# Patient Record
Sex: FEMALE | Race: WHITE | Employment: FULL TIME | URBAN - METROPOLITAN AREA
[De-identification: names, ages, dates, MRNs, and addresses within clinical notes are randomized per-mention and may not be internally consistent; named-entity substitution may affect disease eponyms.]

---

## 2020-02-28 ENCOUNTER — OFFICE VISIT (OUTPATIENT)
Dept: NEUROLOGY | Age: 35
End: 2020-02-28

## 2020-02-28 VITALS
SYSTOLIC BLOOD PRESSURE: 98 MMHG | HEIGHT: 60 IN | DIASTOLIC BLOOD PRESSURE: 62 MMHG | WEIGHT: 113 LBS | RESPIRATION RATE: 20 BRPM | BODY MASS INDEX: 22.19 KG/M2

## 2020-02-28 DIAGNOSIS — S06.0X0A CONCUSSION WITHOUT LOSS OF CONSCIOUSNESS, INITIAL ENCOUNTER: Primary | ICD-10-CM

## 2020-02-28 RX ORDER — AMITRIPTYLINE HYDROCHLORIDE 25 MG/1
25 TABLET, FILM COATED ORAL
Qty: 30 TAB | Refills: 2 | Status: SHIPPED | OUTPATIENT
Start: 2020-02-28 | End: 2020-04-08
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
## 2020-04-03 ENCOUNTER — TELEPHONE (OUTPATIENT)
Dept: NEUROLOGY | Age: 35
End: 2020-04-03

## 2020-04-08 ENCOUNTER — VIRTUAL VISIT (OUTPATIENT)
Dept: NEUROLOGY | Age: 35
End: 2020-04-08

## 2020-04-08 DIAGNOSIS — S06.0X0A CONCUSSION WITHOUT LOSS OF CONSCIOUSNESS, INITIAL ENCOUNTER: Primary | ICD-10-CM

## 2020-04-08 RX ORDER — LIDOCAINE 3% TOPICAL ANESTHETIC CREAM 0.03 G/G
CREAM TOPICAL
COMMUNITY
Start: 2020-03-02 | End: 2021-02-11 | Stop reason: ALTCHOICE

## 2020-04-08 RX ORDER — TRIAMCINOLONE ACETONIDE 1 MG/ML
LOTION TOPICAL
COMMUNITY
Start: 2020-01-30 | End: 2021-02-11 | Stop reason: ALTCHOICE

## 2020-04-08 RX ORDER — ERGOCALCIFEROL 1.25 MG/1
50000 CAPSULE ORAL
COMMUNITY
End: 2020-04-08

## 2020-04-08 RX ORDER — ACETAMINOPHEN 500 MG
TABLET ORAL
COMMUNITY
End: 2021-02-11 | Stop reason: ALTCHOICE
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
## 2020-05-05 ENCOUNTER — TELEPHONE (OUTPATIENT)
Dept: NEUROLOGY | Age: 35
End: 2020-05-05

## 2020-05-05 DIAGNOSIS — S06.0X0A CONCUSSION WITHOUT LOSS OF CONSCIOUSNESS, INITIAL ENCOUNTER: Primary | ICD-10-CM

## 2020-05-05 DIAGNOSIS — G43.009 MIGRAINE WITHOUT AURA AND WITHOUT STATUS MIGRAINOSUS, NOT INTRACTABLE: ICD-10-CM

## 2020-05-05 NOTE — TELEPHONE ENCOUNTER
----- Message from Madison Patricia sent at 5/4/2020  3:13 PM EDT -----  Regarding: Dr. Johana Sheehan  Pt would like a call back regarding reoccurring symptoms.  Contact is  [phone]

## 2020-05-06 NOTE — TELEPHONE ENCOUNTER
Mandy Akhtar, MD Zuleta, April M, LPN   Caller: Unspecified (Today,  3:07 PM)             Order MRI brain without contrast, EEG, Doppler   Follow  up NP after          Called and spoke with patient and gave her MDs advice. I ordered brain MRI without contrast and gave her the # to call and schedule and scheduled her in [place] for doppler and EEG for next week. Patient advised to call back when MRI is done to set up follow up appointment with NP to discuss results.
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
## 2020-05-11 ENCOUNTER — TELEPHONE (OUTPATIENT)
Dept: NEUROLOGY | Age: 35
End: 2020-05-11

## 2020-05-11 NOTE — TELEPHONE ENCOUNTER
----- Message from Beata Cope sent at 5/11/2020 12:23 PM EDT -----  Regarding: /Telephone  General Message/Vendor Calls    Caller's first and last name:Azalea Yanes      Reason for call: orders for new location       Callback required yes/no and why:yes      Best contact number(s):[phone]      Details to clarify the request:Pt called requesting new orders sent to [address] [place] [address] , pt changed location fax umber is [phone] . David KERN has approved the authorization .  Pt appt is scheduled for May 13 at 8:00 am .      Beata Pechristy

## 2020-05-12 ENCOUNTER — OFFICE VISIT (OUTPATIENT)
Dept: NEUROLOGY | Age: 35
End: 2020-05-12

## 2020-05-12 VITALS — TEMPERATURE: 98.7 F

## 2020-05-12 DIAGNOSIS — S06.0X0A CONCUSSION WITHOUT LOSS OF CONSCIOUSNESS, INITIAL ENCOUNTER: Primary | ICD-10-CM

## 2020-05-12 NOTE — PROCEDURES
EEG:      Date:  05/12/20    Requesting Physician:  Kim Stevenson MD    An EEG is requested in this 27year-old, status post concussion, to evaluate for epileptiform abnormality. Medications:  Medications are listed as vitamins. This tracing is obtained during the awake state. During wakefulness there are intermittent runs of posteriorly-dominant and symmetrical low-to-medium amplitude 9 cycle per second activities which attenuate with eye opening. Lower-voltage faster-frequency activities are seen symmetrically over the anterior head regions. Hyperventilation is performed for three minutes and little alters the tracing. Intermittent photic stimulation little alters the tracing. Sleep is not attained. Interpretation: This EEG recorded during the awake state is normal.  No epileptiform or lateralized abnormalities are seen.

## 2020-05-28 ENCOUNTER — TELEPHONE (OUTPATIENT)
Dept: NEUROLOGY | Age: 35
End: 2020-05-28

## 2020-05-28 NOTE — TELEPHONE ENCOUNTER
Called and advised patient to schedule VV with Dr. Dana Douglas to discuss results. I scheduled her for Monday afternoon 6/1.

## 2020-05-28 NOTE — TELEPHONE ENCOUNTER
----- Message from Miriam Jefferson sent at 5/27/2020  2:58 PM EDT -----  Regarding: /telephone  General Message/Vendor Calls    Caller's first and last name:      Reason for call: The pt would like the results from her testing.       Callback required yes/no and why: yes      Best contact number(s):[phone]      Details to clarify the request:

## 2020-06-01 ENCOUNTER — VIRTUAL VISIT (OUTPATIENT)
Dept: NEUROLOGY | Age: 35
End: 2020-06-01

## 2020-06-01 DIAGNOSIS — G44.59 OTHER COMPLICATED HEADACHE SYNDROME: Primary | ICD-10-CM

## 2020-06-01 NOTE — PROGRESS NOTES
Chief Complaint   Patient presents with    Results     virtual visit--discuss results from doppler, EEG, and brain MRI.
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
## 2020-06-22 DIAGNOSIS — G44.59 OTHER COMPLICATED HEADACHE SYNDROME: Primary | ICD-10-CM

## 2020-06-22 RX ORDER — AMITRIPTYLINE HYDROCHLORIDE 25 MG/1
25 TABLET, FILM COATED ORAL
Qty: 30 TAB | Refills: 3 | Status: SHIPPED | OUTPATIENT
Start: 2020-06-22 | End: 2020-11-17 | Stop reason: SDUPTHER

## 2020-06-22 NOTE — TELEPHONE ENCOUNTER
Still having HAs, would like to restart amitriptyline. She was taking 25 mg qhs. I pended this if this is what you would like for her to restart.

## 2020-09-01 ENCOUNTER — OFFICE VISIT (OUTPATIENT)
Dept: NEUROLOGY | Age: 35
End: 2020-09-01
Payer: COMMERCIAL

## 2020-09-01 VITALS
OXYGEN SATURATION: 98 % | WEIGHT: 110 LBS | HEART RATE: 88 BPM | DIASTOLIC BLOOD PRESSURE: 62 MMHG | SYSTOLIC BLOOD PRESSURE: 100 MMHG | TEMPERATURE: 96.5 F | HEIGHT: 60 IN | BODY MASS INDEX: 21.6 KG/M2 | RESPIRATION RATE: 14 BRPM

## 2020-09-01 DIAGNOSIS — G44.59 OTHER COMPLICATED HEADACHE SYNDROME: Primary | ICD-10-CM

## 2020-09-01 PROCEDURE — 99213 OFFICE O/P EST LOW 20 MIN: CPT | Performed by: PSYCHIATRY & NEUROLOGY

## 2020-09-01 NOTE — PROGRESS NOTES
Chief Complaint   Patient presents with    Concussion     Sleep cycle has been better since starting elavil so \"brain fog has pretty much cleared\".     Does get HAs with any ETOH use, even 1/2 glass of wine

## 2020-09-01 NOTE — PROGRESS NOTES
[facility] and [address] at [facility] at [address] [facility], [address] [facility] [address] [facility], [address] [place] [address]   [phone]              Chief Complaint   Patient presents with    Concussion     Current Outpatient Medications   Medication Sig Dispense Refill    amitriptyline (ELAVIL) 25 mg tablet Take 1 Tab by mouth nightly. 30 Tab 3    Lactobacillus acidophilus (Probiotic) 10 billion cell cap Probiotic   daily      lidocaine 3 % crea       phytonadione, vit K1, (MEPHYTON PO) vitamin K   daily      triamcinolone (KENALOG) 0.1 % lotion         Allergies   Allergen Reactions    Pcn [Penicillins] Rash     Social History     Tobacco Use    Smoking status: Never Smoker    Smokeless tobacco: Never Used   Substance Use Topics    Alcohol use: Yes     Comment: wine/whisky    Drug use: Not on file     54-year-old lady returns today for follow-up. Her last visit with me was June 1. At that time we discussed her normal test results. She status post concussion and recurrence of headaches. We discussed going back on Elavil versus seeing how things went. He decided to stay off the Elavil and call the office if headaches were to the point that she wanted to retry those. She did call the office on 22 June noting that she wanted to restart Elavil. That prescription was sent to her pharmacy. She returns today and reports off the Elavil. She feels much better. She does have headaches triggered by wine and that is a bit disturbing for her because she likes to have a glass of wine with dinner. Examination  Visit Vitals  /62 (BP 1 Location: Left arm, BP Patient Position: Sitting)   Pulse 88   Temp (!) 96.5 °F (35.8 °C)   Resp 14   Ht 5' (1.524 m)   Wt 49.9 kg (110 lb)   SpO2 98%   BMI 21.48 kg/m²     Awake alert oriented and conversant. Normal speech and legs. Normal cognitive function. No ataxia. Steady gait    Impression/Plan  Status post concussion doing much better off of Elavil. Continue off meds. Discussed that is not uncommon for alcohol to trigger headaches and certainly wine can do that. She can try some other alcoholic beverages to see if that does as well   In any regard follow as needed    Carlos Li MD      This note was created using voice recognition software. Despite editing, there may be syntax errors. This note will not be viewable in [address]

## 2020-11-17 ENCOUNTER — OFFICE VISIT (OUTPATIENT)
Dept: NEUROLOGY | Age: 35
End: 2020-11-17
Payer: COMMERCIAL

## 2020-11-17 VITALS
SYSTOLIC BLOOD PRESSURE: 104 MMHG | DIASTOLIC BLOOD PRESSURE: 68 MMHG | RESPIRATION RATE: 20 BRPM | OXYGEN SATURATION: 97 % | HEART RATE: 93 BPM | TEMPERATURE: 98.1 F

## 2020-11-17 DIAGNOSIS — G44.59 OTHER COMPLICATED HEADACHE SYNDROME: ICD-10-CM

## 2020-11-17 DIAGNOSIS — S06.0X0A CONCUSSION WITHOUT LOSS OF CONSCIOUSNESS, INITIAL ENCOUNTER: Primary | ICD-10-CM

## 2020-11-17 PROCEDURE — 99214 OFFICE O/P EST MOD 30 MIN: CPT | Performed by: NURSE PRACTITIONER

## 2020-11-17 RX ORDER — AMITRIPTYLINE HYDROCHLORIDE 25 MG/1
25 TABLET, FILM COATED ORAL
Qty: 30 TAB | Refills: 3 | Status: SHIPPED | OUTPATIENT
Start: 2020-11-17 | End: 2021-01-13 | Stop reason: SDUPTHER
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
## 2020-11-17 NOTE — PROGRESS NOTES
Started going to a chiropractor recently she is hoping that it will help with them in time     Since June they improved for a while, throughout October they were more frequent like every other day about 8 headaches some were really bad   November so far she has logged 4 maybe 2 per week, last Saturday was a really bad one   They hurt mostly in the back of her head   No more brain fog since the beginning of the headaches

## 2021-01-13 DIAGNOSIS — S06.0X0A CONCUSSION WITHOUT LOSS OF CONSCIOUSNESS, INITIAL ENCOUNTER: ICD-10-CM

## 2021-01-13 DIAGNOSIS — G44.59 OTHER COMPLICATED HEADACHE SYNDROME: ICD-10-CM

## 2021-01-13 RX ORDER — AMITRIPTYLINE HYDROCHLORIDE 25 MG/1
25 TABLET, FILM COATED ORAL
Qty: 90 TAB | Refills: 0 | Status: SHIPPED | OUTPATIENT
Start: 2021-01-13 | End: 2021-04-21 | Stop reason: SDUPTHER

## 2021-01-25 ENCOUNTER — TELEPHONE (OUTPATIENT)
Dept: NEUROLOGY | Age: 36
End: 2021-01-25

## 2021-01-25 NOTE — TELEPHONE ENCOUNTER
----- Message from Aleta Anaya sent at 1/25/2021  1:04 PM EST -----  Regarding: Dr. Cortez Lockhart Message/Vendor Calls    Caller's first and last name:Katie Gary and Brittanie W Fior López      Reason for call: would like to know the last date of treatment      Callback required yes/no and why: yes      Best contact number(s):[phone]       Details to clarify the request:      Aleta Anaya

## 2021-02-11 ENCOUNTER — VIRTUAL VISIT (OUTPATIENT)
Dept: NEUROLOGY | Age: 36
End: 2021-02-11
Payer: COMMERCIAL

## 2021-02-11 DIAGNOSIS — G44.59 OTHER COMPLICATED HEADACHE SYNDROME: Primary | ICD-10-CM

## 2021-02-11 DIAGNOSIS — G44.319 ACUTE POST-TRAUMATIC HEADACHE, NOT INTRACTABLE: ICD-10-CM

## 2021-02-11 PROCEDURE — 99213 OFFICE O/P EST LOW 20 MIN: CPT | Performed by: NURSE PRACTITIONER
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
## 2021-03-01 ENCOUNTER — PATIENT MESSAGE (OUTPATIENT)
Dept: NEUROLOGY | Age: 36
End: 2021-03-01

## 2021-04-21 DIAGNOSIS — G44.59 OTHER COMPLICATED HEADACHE SYNDROME: ICD-10-CM

## 2021-04-21 DIAGNOSIS — S06.0X0A CONCUSSION WITHOUT LOSS OF CONSCIOUSNESS, INITIAL ENCOUNTER: ICD-10-CM

## 2021-04-21 RX ORDER — AMITRIPTYLINE HYDROCHLORIDE 25 MG/1
TABLET, FILM COATED ORAL
Qty: 90 TAB | Refills: 0 | OUTPATIENT
Start: 2021-04-21

## 2021-04-21 RX ORDER — AMITRIPTYLINE HYDROCHLORIDE 25 MG/1
25 TABLET, FILM COATED ORAL
Qty: 90 TAB | Refills: 0 | Status: SHIPPED | OUTPATIENT
Start: 2021-04-21 | End: 2021-06-14

## 2021-04-27 ENCOUNTER — OFFICE VISIT (OUTPATIENT)
Dept: NEUROLOGY | Age: 36
End: 2021-04-27
Payer: COMMERCIAL

## 2021-04-27 VITALS
HEIGHT: 60 IN | TEMPERATURE: 98.1 F | HEART RATE: 75 BPM | BODY MASS INDEX: 21.71 KG/M2 | RESPIRATION RATE: 18 BRPM | WEIGHT: 110.6 LBS | OXYGEN SATURATION: 100 %

## 2021-04-27 DIAGNOSIS — G44.59 OTHER COMPLICATED HEADACHE SYNDROME: Primary | ICD-10-CM

## 2021-04-27 DIAGNOSIS — M79.18 MYOFASCIAL MUSCLE PAIN: ICD-10-CM

## 2021-04-27 DIAGNOSIS — G44.319 ACUTE POST-TRAUMATIC HEADACHE, NOT INTRACTABLE: ICD-10-CM

## 2021-04-27 PROCEDURE — 99214 OFFICE O/P EST MOD 30 MIN: CPT | Performed by: NURSE PRACTITIONER

## 2021-04-27 RX ORDER — RIBOFLAVIN (VITAMIN B2) 100 MG
TABLET ORAL DAILY
COMMUNITY

## 2021-04-27 RX ORDER — LANOLIN ALCOHOL/MO/W.PET/CERES
400 CREAM (GRAM) TOPICAL DAILY
COMMUNITY

## 2021-04-27 RX ORDER — MULTIVIT WITH MINERALS/HERBS
1 TABLET ORAL DAILY
COMMUNITY
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
## 2021-05-13 ENCOUNTER — OFFICE VISIT (OUTPATIENT)
Dept: NEUROLOGY | Age: 36
End: 2021-05-13

## 2021-05-13 DIAGNOSIS — M79.18 MYOFASCIAL MUSCLE PAIN: Primary | ICD-10-CM

## 2021-05-13 DIAGNOSIS — G44.319 ACUTE POST-TRAUMATIC HEADACHE, NOT INTRACTABLE: ICD-10-CM

## 2021-05-18 NOTE — PROGRESS NOTES
[address]   OFFICE PROCEDURE NOTE    Patient Name: Soraida Molina  YOB: 1985  MRN: [id number]    PROCEDURE: Trigger Point Injection  Date of Procedure: 5/13/2021  CPT Code:  35247    ICD-10 Code:     ICD-10-CM ICD-9-CM    1. Myofascial muscle pain  M79.18 729.1    2. Acute post-traumatic headache, not intractable  G44.319 339.21        Time Out performed immediately prior to the start of procedure:  I, Dominguez Pina NP, have performed the following review on Azalea Yanes prior to the start of the procedure: Trigger Point Injection. The patient was identified by name and date of birth. Agreement on the procedure to be performed was verified. The potential Benefits and potential Risks were explained to the patient. The procedure site(s) were verified and marked as necessary. Consent was signed and verified. The patient was positioned for comfort. Time: 0830. Date of Procedure: 5/13/2021. Procedure performed by: Dominguez Pina NP. Provider assisted by: none. Patient assisted by: self. How patient tolerated procedure: mild procedure-related pain, no complications. Comments: none. Medications/ Supplies:   6 mL Bupicacaine 0.5%  27G x 1 and 1/4\" sterile needle     TECHNICAL:    The procedure and potential complications were explained to the patient, and verbal consent was obtained. 6 areas of trigger point tenderness was identified in the trapezius muscles and along the scapular border, 3 on the right and three on the left, and marked with a ballpoint pen. The above medication was drawn up in a sterile fashion. The prior marked sites were cleaned with alcohol swabs and 1 mL solution was injected at each site without complications. The patient appeared to have tolerated the procedure well (mild procedure related pain) without any complications.       Signed By: Dominguez Pina NP     May 18, 2021

## 2021-06-14 ENCOUNTER — OFFICE VISIT (OUTPATIENT)
Dept: NEUROLOGY | Age: 36
End: 2021-06-14
Payer: COMMERCIAL

## 2021-06-14 VITALS
OXYGEN SATURATION: 100 % | SYSTOLIC BLOOD PRESSURE: 104 MMHG | HEART RATE: 63 BPM | DIASTOLIC BLOOD PRESSURE: 64 MMHG | RESPIRATION RATE: 14 BRPM

## 2021-06-14 DIAGNOSIS — G44.319 ACUTE POST-TRAUMATIC HEADACHE, NOT INTRACTABLE: Primary | ICD-10-CM

## 2021-06-14 DIAGNOSIS — M79.18 MYOFASCIAL MUSCLE PAIN: ICD-10-CM

## 2021-06-14 PROCEDURE — 99213 OFFICE O/P EST LOW 20 MIN: CPT | Performed by: NURSE PRACTITIONER
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
## 2021-06-14 NOTE — LETTER
6/14/2021 Patient: Minoo Clinton YOB: 1985 Date of Visit: 6/14/2021 Brook Echavarria MD 
[facility] [address] [facility] [phone] [address] Fax: [phone] Dear Brook Echavarria MD, Thank you for referring Ms. Grant Franklin to [facility] for evaluation. My notes for this consultation are attached. If you have questions, please do not hesitate to call me. I look forward to following your patient along with you.  
 
 
Sincerely, 
 
Yan Adan NP

## 2021-11-11 ENCOUNTER — TRANSCRIBE ORDER (OUTPATIENT)
Dept: GENERAL RADIOLOGY | Age: 36
End: 2021-11-11

## 2021-11-11 ENCOUNTER — HOSPITAL ENCOUNTER (OUTPATIENT)
Dept: GENERAL RADIOLOGY | Age: 36
Discharge: HOME | End: 2021-11-11
Attending: FAMILY MEDICINE
Payer: COMMERCIAL

## 2021-11-11 DIAGNOSIS — M54.2 CERVICALGIA: ICD-10-CM

## 2021-11-11 DIAGNOSIS — M54.2 CERVICALGIA: Primary | ICD-10-CM

## 2021-11-11 PROCEDURE — 72040 X-RAY EXAM NECK SPINE 2-3 VW: CPT

## 2023-03-30 ENCOUNTER — TELEPHONE (OUTPATIENT)
Dept: NEUROLOGY | Age: 38
End: 2023-03-30

## 2023-03-30 NOTE — TELEPHONE ENCOUNTER
Pt calling to report her headaches have not gotten better and are getting a bit worse. She would like to speak with nurse for guidance on whether she should continue with neurology or if there is another path she should take to find relief. What other options can she try. Please call to discuss.

## 2023-05-22 RX ORDER — MAGNESIUM OXIDE 400 MG/1
400 TABLET ORAL DAILY
COMMUNITY